# Patient Record
Sex: FEMALE | Race: WHITE | NOT HISPANIC OR LATINO | URBAN - METROPOLITAN AREA
[De-identification: names, ages, dates, MRNs, and addresses within clinical notes are randomized per-mention and may not be internally consistent; named-entity substitution may affect disease eponyms.]

---

## 2017-04-13 ENCOUNTER — APPOINTMENT (RX ONLY)
Dept: URBAN - METROPOLITAN AREA CLINIC 12 | Facility: CLINIC | Age: 67
Setting detail: DERMATOLOGY
End: 2017-04-13

## 2017-04-13 DIAGNOSIS — D18.0 HEMANGIOMA: ICD-10-CM

## 2017-04-13 DIAGNOSIS — L82.0 INFLAMED SEBORRHEIC KERATOSIS: ICD-10-CM

## 2017-04-13 DIAGNOSIS — D22 MELANOCYTIC NEVI: ICD-10-CM

## 2017-04-13 DIAGNOSIS — L57.0 ACTINIC KERATOSIS: ICD-10-CM

## 2017-04-13 DIAGNOSIS — L82.1 OTHER SEBORRHEIC KERATOSIS: ICD-10-CM

## 2017-04-13 DIAGNOSIS — L81.4 OTHER MELANIN HYPERPIGMENTATION: ICD-10-CM

## 2017-04-13 DIAGNOSIS — L70.0 ACNE VULGARIS: ICD-10-CM

## 2017-04-13 PROBLEM — F41.9 ANXIETY DISORDER, UNSPECIFIED: Status: ACTIVE | Noted: 2017-04-13

## 2017-04-13 PROBLEM — D48.5 NEOPLASM OF UNCERTAIN BEHAVIOR OF SKIN: Status: ACTIVE | Noted: 2017-04-13

## 2017-04-13 PROBLEM — D18.01 HEMANGIOMA OF SKIN AND SUBCUTANEOUS TISSUE: Status: ACTIVE | Noted: 2017-04-13

## 2017-04-13 PROBLEM — M12.9 ARTHROPATHY, UNSPECIFIED: Status: ACTIVE | Noted: 2017-04-13

## 2017-04-13 PROBLEM — H91.90 UNSPECIFIED HEARING LOSS, UNSPECIFIED EAR: Status: ACTIVE | Noted: 2017-04-13

## 2017-04-13 PROBLEM — L20.84 INTRINSIC (ALLERGIC) ECZEMA: Status: ACTIVE | Noted: 2017-04-13

## 2017-04-13 PROBLEM — D22.5 MELANOCYTIC NEVI OF TRUNK: Status: ACTIVE | Noted: 2017-04-13

## 2017-04-13 PROCEDURE — 99203 OFFICE O/P NEW LOW 30 MIN: CPT | Mod: 25

## 2017-04-13 PROCEDURE — ? LIQUID NITROGEN

## 2017-04-13 PROCEDURE — 11100: CPT | Mod: 59

## 2017-04-13 PROCEDURE — ? TREATMENT REGIMEN

## 2017-04-13 PROCEDURE — ? BIOPSY BY SHAVE METHOD

## 2017-04-13 PROCEDURE — 17000 DESTRUCT PREMALG LESION: CPT

## 2017-04-13 ASSESSMENT — LOCATION DETAILED DESCRIPTION DERM
LOCATION DETAILED: LEFT MEDIAL DISTAL PRETIBIAL REGION
LOCATION DETAILED: NASAL SUPRATIP
LOCATION DETAILED: LEFT SUPERIOR MEDIAL LOWER BACK
LOCATION DETAILED: RIGHT RADIAL DORSAL HAND
LOCATION DETAILED: INFERIOR LUMBAR SPINE

## 2017-04-13 ASSESSMENT — LOCATION SIMPLE DESCRIPTION DERM
LOCATION SIMPLE: RIGHT HAND
LOCATION SIMPLE: NOSE
LOCATION SIMPLE: LEFT LOWER BACK
LOCATION SIMPLE: LEFT PRETIBIAL REGION
LOCATION SIMPLE: LOWER BACK

## 2017-04-13 ASSESSMENT — LOCATION ZONE DERM
LOCATION ZONE: TRUNK
LOCATION ZONE: NOSE
LOCATION ZONE: HAND
LOCATION ZONE: LEG

## 2017-04-13 NOTE — PROCEDURE: BIOPSY BY SHAVE METHOD
Lab: [id number]
Type Of Destruction Used: Cryotherapy
Billing Type: Third-Party Bill
Silver Nitrate Text: The wound bed was treated with silver nitrate after the biopsy was performed.
Notification Instructions: You will be contacted in approximately 7 to 10 business days regarding your biopsy or excision results.  If you have not heard from our office within 2 weeks, please call our office and ask to speak with Dr. Crews’s medical assistant, Tammi, at [phone], extension [phone].
Hemostasis: Aluminum Chloride
X Size Of Lesion In Cm: 0
Electrodesiccation Text: The wound bed was treated with electrodesiccation after the biopsy was performed.
Render Post-Care Instructions In Note?: yes
Wound Care: No ointment
Electrodesiccation And Curettage Text: The wound bed was treated with electrodesiccation and curettage after the biopsy was performed.
Biopsy Type: H and E
Anesthesia Volume In Cc (Will Not Render If 0): 0.5
Anesthesia Type: 1% lidocaine with 1:100,000 epinephrine and a 1:10 solution of 8.4% sodium bicarbonate
Consent: Written consent was obtained and risks were reviewed including but not limited to scarring, infection, bleeding, scabbing, incomplete removal, nerve damage and allergy to anesthesia.
Bill For Surgical Tray: no
Cryotherapy Text: The wound bed was treated with cryotherapy after the biopsy was performed.
Post-Care Instructions: I reviewed with the patient in detail post-care instructions. \\n\\n• Keep the bandaid or pressure dressing dry and in place for 24 hours or as ordered.  Then wet the dressing in the shower or with a wet towel.  Peel it off gently.  After 24 hours, you may bathe normally.\\n• Gently clean the site once a day with soap and water and apply Polysporin antibiotic ointment or petroleum jelly (Vaseline)or Aquaphor to avoid scab formation.  \\n• Do not submerge the site under water in a pool, bathtub or hot tub for 7 days or until sutures are removed.\\n• For any discomfort, you may use a non-aspirin product for pain and discomfort – such as Tylenol or Tylenol Extra-Strength.  AVOID Aleve®, Advil®, Motrin®, ibuprofen and aspirin products for the first 24 hours as they increase bleeding.\\n• If the surgical wound is on the face or scalp, swelling, bruising, and throbbing may occur which can be minimized by sleeping with the head elevated.  Swelling will occur and can be decreased by keeping the surgical site elevated and applying a cold pack around the bandage for 10-15 minutes of every hour.\\n• Oozing a small amount of blood may be controlled by applying continuous pressure directly to the site with a clean gauze or washcloth for 15-20 minutes.  If bleeding does not stop after 15- 20 minutes of holding continuous pressure, repeat for an additional 20 minutes.  If the bleeding persists, then please contact our office at the number below.\\n• Watch for signs of infection:  increasing tenderness or redness, swelling, drainage of pus, opening of wound or temperature over 101.  Call our office if you think you may have an infection.  A normal healing site will have some light redness around the edges of the site, but bright red would indicate a possible infection.
Size Of Lesion In Cm: 0.8
Dressing: bandage
Body Location Override (Optional - Billing Will Still Be Based On Selected Body Map Location If Applicable): Left medial lower leg
Biopsy Method: 15 blade
Detail Level: Detailed

## 2017-04-13 NOTE — PROCEDURE: LIQUID NITROGEN
Number Of Freeze-Thaw Cycles: 1 freeze-thaw cycle
Render Post-Care Instructions In Note?: no
Duration Of Freeze Thaw-Cycle (Seconds): 10
Detail Level: Detailed
Consent: The patient's consent was obtained including but not limited to risks of crusting, scabbing, blistering, scarring, darker or lighter pigmentary change, recurrence, incomplete removal and infection.
Post-Care Instructions: I reviewed with the patient in detail post-care instructions. Patient is to wear sunprotection, and avoid picking at any of the treated lesions. Pt may apply Vaseline to crusted or scabbing areas.

## 2017-04-13 NOTE — HPI: EVALUATION OF SKIN LESION(S)
Hpi Title: Evaluation of Skin Lesions
Additional History: [place] and has had several peeling and blistering sunburns.

## 2017-05-26 ENCOUNTER — APPOINTMENT (RX ONLY)
Dept: URBAN - METROPOLITAN AREA CLINIC 12 | Facility: CLINIC | Age: 67
Setting detail: DERMATOLOGY
End: 2017-05-26

## 2017-05-26 DIAGNOSIS — L57.0 ACTINIC KERATOSIS: ICD-10-CM

## 2017-05-26 PROCEDURE — 17000 DESTRUCT PREMALG LESION: CPT

## 2017-05-26 PROCEDURE — ? OTHER

## 2017-05-26 PROCEDURE — ? LIQUID NITROGEN

## 2017-05-26 ASSESSMENT — LOCATION DETAILED DESCRIPTION DERM: LOCATION DETAILED: LEFT MEDIAL DISTAL PRETIBIAL REGION

## 2017-05-26 ASSESSMENT — LOCATION ZONE DERM: LOCATION ZONE: LEG

## 2017-05-26 ASSESSMENT — LOCATION SIMPLE DESCRIPTION DERM: LOCATION SIMPLE: LEFT PRETIBIAL REGION

## 2017-05-26 NOTE — PROCEDURE: LIQUID NITROGEN
Consent: The patient's consent was obtained including but not limited to risks of crusting, scabbing, blistering, scarring, darker or lighter pigmentary change, recurrence, incomplete removal and infection.
Post-Care Instructions: I reviewed with the patient in detail post-care instructions. Patient is to wear sunprotection, and avoid picking at any of the treated lesions. Pt may apply Vaseline to crusted or scabbing areas.
Duration Of Freeze Thaw-Cycle (Seconds): 10
Render Post-Care Instructions In Note?: yes
Number Of Freeze-Thaw Cycles: 3 freeze-thaw cycles
Detail Level: Detailed

## 2017-05-26 NOTE — PROCEDURE: OTHER
Note Text (......Xxx Chief Complaint.): This diagnosis correlates with the
Detail Level: Detailed
Other (Free Text): Biopsy proven - LEFT MEDIAL LOWER LEG

## 2017-12-01 ENCOUNTER — APPOINTMENT (RX ONLY)
Dept: URBAN - METROPOLITAN AREA CLINIC 12 | Facility: CLINIC | Age: 67
Setting detail: DERMATOLOGY
End: 2017-12-01

## 2017-12-01 DIAGNOSIS — D22 MELANOCYTIC NEVI: ICD-10-CM

## 2017-12-01 DIAGNOSIS — L81.4 OTHER MELANIN HYPERPIGMENTATION: ICD-10-CM

## 2017-12-01 DIAGNOSIS — L82.1 OTHER SEBORRHEIC KERATOSIS: ICD-10-CM

## 2017-12-01 DIAGNOSIS — Z71.89 OTHER SPECIFIED COUNSELING: ICD-10-CM

## 2017-12-01 DIAGNOSIS — D18.0 HEMANGIOMA: ICD-10-CM

## 2017-12-01 DIAGNOSIS — L57.8 OTHER SKIN CHANGES DUE TO CHRONIC EXPOSURE TO NONIONIZING RADIATION: ICD-10-CM

## 2017-12-01 PROBLEM — D18.01 HEMANGIOMA OF SKIN AND SUBCUTANEOUS TISSUE: Status: ACTIVE | Noted: 2017-12-01

## 2017-12-01 PROBLEM — D22.5 MELANOCYTIC NEVI OF TRUNK: Status: ACTIVE | Noted: 2017-12-01

## 2017-12-01 PROCEDURE — 99213 OFFICE O/P EST LOW 20 MIN: CPT

## 2017-12-01 PROCEDURE — ? COUNSELING

## 2017-12-01 ASSESSMENT — LOCATION DETAILED DESCRIPTION DERM
LOCATION DETAILED: RIGHT PROXIMAL DORSAL FOREARM
LOCATION DETAILED: RIGHT RADIAL DORSAL HAND
LOCATION DETAILED: LEFT PROXIMAL DORSAL FOREARM
LOCATION DETAILED: LEFT DISTAL POSTERIOR THIGH
LOCATION DETAILED: LEFT RADIAL DORSAL HAND
LOCATION DETAILED: INFERIOR LUMBAR SPINE
LOCATION DETAILED: RIGHT DISTAL POSTERIOR THIGH
LOCATION DETAILED: LEFT SUPERIOR MEDIAL LOWER BACK

## 2017-12-01 ASSESSMENT — LOCATION ZONE DERM
LOCATION ZONE: TRUNK
LOCATION ZONE: ARM
LOCATION ZONE: LEG
LOCATION ZONE: HAND

## 2017-12-01 ASSESSMENT — LOCATION SIMPLE DESCRIPTION DERM
LOCATION SIMPLE: RIGHT FOREARM
LOCATION SIMPLE: RIGHT HAND
LOCATION SIMPLE: LEFT HAND
LOCATION SIMPLE: LEFT LOWER BACK
LOCATION SIMPLE: RIGHT POSTERIOR THIGH
LOCATION SIMPLE: LEFT FOREARM
LOCATION SIMPLE: LEFT POSTERIOR THIGH
LOCATION SIMPLE: LOWER BACK

## 2018-09-05 ENCOUNTER — HOSPITAL ENCOUNTER (INPATIENT)
Dept: HOSPITAL 80 - F3N | Age: 68
LOS: 37 days | Discharge: HOME HEALTH | DRG: 460 | End: 2018-10-12
Attending: NEUROLOGICAL SURGERY | Admitting: NEUROLOGICAL SURGERY
Payer: COMMERCIAL

## 2018-09-05 DIAGNOSIS — M51.16: ICD-10-CM

## 2018-09-05 DIAGNOSIS — M48.062: ICD-10-CM

## 2018-09-05 DIAGNOSIS — M43.16: Primary | ICD-10-CM

## 2018-09-05 PROCEDURE — C1713 ANCHOR/SCREW BN/BN,TIS/BN: HCPCS

## 2018-10-10 PROCEDURE — 0SG00AJ FUSION OF LUMBAR VERTEBRAL JOINT WITH INTERBODY FUSION DEVICE, POSTERIOR APPROACH, ANTERIOR COLUMN, OPEN APPROACH: ICD-10-PCS | Performed by: NEUROLOGICAL SURGERY

## 2018-10-10 PROCEDURE — 3E0U0GB INTRODUCTION OF RECOMBINANT BONE MORPHOGENETIC PROTEIN INTO JOINTS, OPEN APPROACH: ICD-10-PCS | Performed by: NEUROLOGICAL SURGERY

## 2018-10-10 PROCEDURE — 0QB00ZZ EXCISION OF LUMBAR VERTEBRA, OPEN APPROACH: ICD-10-PCS | Performed by: NEUROLOGICAL SURGERY

## 2018-10-10 PROCEDURE — 0ST20ZZ RESECTION OF LUMBAR VERTEBRAL DISC, OPEN APPROACH: ICD-10-PCS | Performed by: NEUROLOGICAL SURGERY

## 2018-10-10 PROCEDURE — 01NB0ZZ RELEASE LUMBAR NERVE, OPEN APPROACH: ICD-10-PCS | Performed by: NEUROLOGICAL SURGERY

## 2018-10-10 RX ADMIN — GABAPENTIN SCH: 300 CAPSULE ORAL at 15:21

## 2018-10-10 RX ADMIN — Medication PRN MCG: at 11:12

## 2018-10-10 RX ADMIN — GABAPENTIN SCH: 300 CAPSULE ORAL at 23:33

## 2018-10-10 RX ADMIN — Medication PRN MCG: at 11:06

## 2018-10-10 RX ADMIN — GABAPENTIN SCH: 300 CAPSULE ORAL at 21:43

## 2018-10-10 RX ADMIN — ACETAMINOPHEN SCH MG: 500 TABLET ORAL at 15:18

## 2018-10-10 RX ADMIN — Medication SCH MLS: at 17:34

## 2018-10-10 RX ADMIN — DOCUSATE SODIUM AND SENNOSIDES SCH: 50; 8.6 TABLET ORAL at 15:12

## 2018-10-10 RX ADMIN — DOCUSATE SODIUM AND SENNOSIDES SCH TAB: 50; 8.6 TABLET ORAL at 21:44

## 2018-10-10 RX ADMIN — HYDROMORPHONE HYDROCHLORIDE PRN MG: 2 INJECTION INTRAMUSCULAR; INTRAVENOUS; SUBCUTANEOUS at 11:26

## 2018-10-10 RX ADMIN — FAMOTIDINE SCH: 20 TABLET, FILM COATED ORAL at 15:12

## 2018-10-10 RX ADMIN — HYDROMORPHONE HYDROCHLORIDE PRN MG: 2 INJECTION INTRAMUSCULAR; INTRAVENOUS; SUBCUTANEOUS at 11:15

## 2018-10-10 RX ADMIN — FAMOTIDINE SCH MG: 20 TABLET, FILM COATED ORAL at 21:42

## 2018-10-10 RX ADMIN — Medication SCH MLS: at 23:25

## 2018-10-10 RX ADMIN — OXYCODONE HYDROCHLORIDE PRN MG: 15 TABLET ORAL at 18:41

## 2018-10-10 RX ADMIN — ACETAMINOPHEN SCH MG: 500 TABLET ORAL at 21:42

## 2018-10-10 NOTE — SOAPPROG
SOAP Progress Note


Assessment/Plan: 








Post Op Visit:





S: Awake and alert.  Pt with expected lower back pain


O: AFVSS/PERRLA/EOMI


no droop


CN 2-12 grossly intact


+lt touch


5/5 BUE/BLE =


CDI


MELISSA in place and working well





A/P: 68 yo female that is s/p TLIF at L4/5


-orders in place


-brace when out of bed


-call with any questions or concerns


-pt understands and agrees


-no bending or twisting








10/10/18 11:04








ICD10 Worksheet


Patient Problems: 


 Problems











Problem Status Onset


 


Arthrodesis status Acute  


 


Lumbar radicular pain Acute  


 


Lumbar stenosis Acute  














- ICD10 Problem Qualifiers


(1) Lumbar radicular pain








(2) Lumbar stenosis








(3) Arthrodesis status

## 2018-10-10 NOTE — PDANEPAE
ANE History of Present Illness





L4-5 TLIF





ANE Past Medical History





- Cardiovascular History


Hx Hypertension: No


Hx Arrhythmias: No


Hx Chest Pain: No


Hx Coronary Artery / Peripheral Vascular Disease: No


Hx CHF / Valvular Disease: No


Hx Palpitations: No


Cardiovascular History Comment: blood pressure is normal but went very low 

after surgery 01/2018





- Pulmonary History


Hx COPD: No


Hx Asthma/Reactive Airway Disease: No


Hx Recent Upper Respiratory Infection: No


Hx Oxygen in Use at Home: No


Hx Sleep Apnea: No


Sleep Apnea Screening Result - Last Documented: Negative


Pulmonary History Comment: chronic cough from reflux





- Neurologic History


Hx Cerebrovascular Accident: No


Hx Seizures: No


Hx Dementia: No


Neurologic History Comment: cervical stenosis causes neck pain at times





- Endocrine History


Hx Diabetes: No





- Renal History


Hx Renal Disorders: No





- Liver History


Hx Hepatic Disorders: No





- Neurological & Psychiatric Hx


Hx Neurological and Psychiatric Disorders: Yes


Neurological / Psychiatric History Comment: tends to be anxious- no diagnosis





- Cancer History


Hx Cancer: No





- Congenital Disorder History


Hx Congenital Disorders: No





- GI History


Hx Gastrointestinal Disorders: Yes


Gastrointestinal History Comment: reflux- chronic cough from





- Other Health History


Other Health History: wears bilateral hearing aides.  wears glasses.  old torn 

rtc on right.  weak and painful left big toe when walking





- Chronic Pain History


Chronic Pain: Yes (lower back and legs and some neck)





- Surgical History


Prior Surgeries: left MARCE 01/2018 at [facility].  





ANE Review of Systems


Review of Systems: 








- Exercise capacity


METS (RN): 4 METS





ANE Patient History





- Allergies


Allergies/Adverse Reactions: 








thimerosal Allergy (Verified 09/24/18 16:04)


 From contact lens solution - eyes red/itchy








- Home Medications


Home medications: home medication list seen and reviewed


Home Medications: 








Ascorbic Acid [Vitamin C 500 mg (*)] 1,000 mg PO BID 09/17/18 [Last Taken 10/03/

18]


C/E/Zn/Cu/OM3/DHA/EPA/LUT/ZEAX [Preservision Areds 2 Softgel] 1 each PO BID 09/ 17/18 [Last Taken 10/03/18]


Cholecalciferol Vit D3 [Vitamin D3 (*)] 1,000 units PO DAILY 09/17/18 [Last 

Taken 10/03/18]


Eszopiclone [Lunesta] 2 mg PO HS 09/17/18 [Last Taken 10/09/18]


Herbals/Supplements -Info Only 1 ea PO DAILY 09/17/18 [Last Taken 10/03/18]


traZODone [traZODONE 50MG (*)] 100 - 150 mg PO HS 09/17/18 [Last Taken Unknown]








- NPO status


NPO Since - Liquids (Date): 10/10/18


NPO Since - Liquids (Time): 07:00


NPO Since - Solids (Date): 10/09/18





- Smoking Hx


Smoking Status: Never smoked





- Family Anes Hx


Family Hx Anesthesia Complications: none





ANE Labs/Vital Signs





- Vital Signs


Height: 166.37 cm


Weight: 70.307 kg





ANE Physical Exam





- Airway


Neck exam: FROM


Mallampati Score: Class 2


Mouth exam: normal dental/mouth exam





- Pulmonary


Pulmonary: no respiratory distress





- Cardiovascular


Cardiovascular: regular rate and rhythym





- ASA Status


ASA Status: II





ANE Anesthesia Plan


Anesthesia Plan: general endotracheal anesthesia


Lines/Monitors: additional IV

## 2018-10-10 NOTE — POSTOPPROG
Post Op Note


Date of Operation: 10/10/18


Surgeon: LUIS Butts


Assistant: Alexandra


Anesthesia: GET(General Endotracheal), Local (Specify)


Pre-op Diagnosis: lumbar stenosis L4/5


Post-op Diagnosis: Lumbar stenosis L4/5


Indication: lumbar radiculopathy/stenosis


Procedure: TLIF L4/5


Findings: none


Inf/Abcess present in the surg proc area at time of surgery?: No


Depth: Deep Incisional (Fascial)


EBL: 100-500


Drains: Priyank Mason

## 2018-10-11 RX ADMIN — GABAPENTIN SCH: 300 CAPSULE ORAL at 14:25

## 2018-10-11 RX ADMIN — DOCUSATE SODIUM AND SENNOSIDES SCH TAB: 50; 8.6 TABLET ORAL at 20:37

## 2018-10-11 RX ADMIN — GABAPENTIN SCH: 300 CAPSULE ORAL at 23:04

## 2018-10-11 RX ADMIN — METHOCARBAMOL PRN MG: 750 TABLET ORAL at 08:20

## 2018-10-11 RX ADMIN — FAMOTIDINE SCH MG: 20 TABLET, FILM COATED ORAL at 20:37

## 2018-10-11 RX ADMIN — OXYCODONE HYDROCHLORIDE PRN MG: 15 TABLET ORAL at 18:16

## 2018-10-11 RX ADMIN — DOCUSATE SODIUM AND SENNOSIDES SCH TAB: 50; 8.6 TABLET ORAL at 08:20

## 2018-10-11 RX ADMIN — FAMOTIDINE SCH MG: 20 TABLET, FILM COATED ORAL at 08:20

## 2018-10-11 RX ADMIN — ACETAMINOPHEN SCH: 500 TABLET ORAL at 05:12

## 2018-10-11 RX ADMIN — ACETAMINOPHEN SCH MG: 500 TABLET ORAL at 22:02

## 2018-10-11 RX ADMIN — ACETAMINOPHEN SCH MG: 500 TABLET ORAL at 14:13

## 2018-10-11 RX ADMIN — METHOCARBAMOL PRN MG: 750 TABLET ORAL at 14:30

## 2018-10-11 NOTE — NEUSURGPN
Date of Surgery: 10/10/18


Post Op Day: 1


Assessment/Plan: 


Assessment: 68 yo female that is s/p TLIF at L4/5 POD #1





Plan:


-s/p TLIF L4/5-pt states she has some expected lower back pain, legs feel fine


-PT/OT pending


-orders in place


-brace when out of bed


-post op xrays pending


-call with any questions or concerns


-pt understands and agrees


-no bending or twisting


Subjective: 


Awake and alert. NAD.  Eating/drinking and voiding.  No f/c/n/v/d.  No ha/neck/

chest/abd of gu complaints.  


Objective: 


AFVSS/PERRLA/EOMI


no droop


CN 2-12 grossly intact


+lt touch


5/5 BUE/BLE =


CDI


MELISSA in place and working well-will dc later today


Neuro Check Frequency: per routine


Urinary Catheter in Place: No





- Physician


Discussed Patient with .: Benjamín


Patient Seen by .: Benjamín





Neurosurgery Physical Exam





- Vitals, I&O, Labs





 I and O











 10/10/18 10/11/18 10/12/18





 05:59 05:59 05:59


 


Intake Total  5101 


 


Output Total  4235 


 


Balance  866 


 


Weight  70.307 kg 


 


Intake:   


 


  Oral (ml)  2750 


 


  IV Intake (ml)  1600 


 


  IV Infused (ml)  751 


 


    Lr 1,000 ml @ TKO IV ONCE  751 





    ONE Rx#:[id number]   


 


Output:   


 


  Urine (ml)  3875 


 


    Toilet  3875 


 


  Estimated Blood Loss (ml)  100 


 


  MELISSA Drain Output (ml)  260 


 


    #1 Back Priyank Mason  260 


 


Other:   


 


  Number of Voids   


 


    Toilet  1 








 Vital Signs











Temp Pulse Resp BP Pulse Ox


 


 37.1 C   74   16   117/82 H  92 


 


 10/11/18 08:00  10/11/18 08:00  10/11/18 08:00  10/11/18 08:00  10/11/18 08:00














ICD10 Worksheet


Patient Problems: 


 Problems











Problem Status Onset


 


Arthrodesis status Acute  


 


Lumbar radicular pain Acute  


 


Lumbar stenosis Acute  














- ICD10 Problem Qualifiers


(1) Lumbar radicular pain








(2) Lumbar stenosis








(3) Arthrodesis status

## 2018-10-11 NOTE — ASMTCMCOM
CM Note

 

CM Note                       

Notes:

Pt s/p planned surgery for stenosis, DDD. [facility], [facility] with [facility] and [facility]. Renetta with [facility] visited 

with pt today. [facility] has protocol with MD office so no orders needed at d/c



D/c plan of care: Home with [facility]

 

Date Signed:  10/11/2018 02:12 PM

Electronically Signed By:CHRISTI Agosto

## 2018-10-11 NOTE — GOP
DATE OF OPERATION:  10/10/2018



SURGEON:  FRANSISCO Butts MD



ASSISTANT:  Adolfo Morris PA-C.



PREOPERATIVE DIAGNOSIS:  Lumbar spondylolisthesis, L4-5.  Severe stenosis, L4-5.  Bilateral lumbosacr
al radiculopathy, L4-5.



POSTOPERATIVE DIAGNOSIS:  Lumbar spondylolisthesis, L4-5. Severe stenosis, L4-5.  Bilateral lumbosacr
al radiculopathy, L4-5.



PROCEDURE PERFORMED:  Posterolateral intervertebral arthrodesis with bilateral decompressions, L4-5 (
23172); posterior nonsegmental instrumentation across a single interspace L4-5 (95038); spinal stereo
taxis, placement of biomechanical intervertebral device, L4-5, same incision bone graft harvest, micr
oscope.



FINDINGS:  Consistent with the MRI.





ESTIMATED BLOOD LOSS:  Less than 100 cc.



INDICATIONS:  The patient is a 67-year-old who had bilateral lumbosacral radiculopathy and severe cayla
n radiating down both legs, whose MRI demonstrated a spondylolisthesis at L4-5, severe stenosis at th
at level and I suggested a single-level fusion with bilateral decompressions.  The risk of continued 
symptoms was discussed.  She knew there was a chance surgery would fail to eliminate the pain.  She k
new there was a risk of nerve injury, spinal fluid leak, infection, pseudoarthrosis, and adjacent seg
ment disease.  She wanted to proceed despite these risks.



DESCRIPTION OF PROCEDURE:  The patient was taken to the operating room, placed in supine position.  G
eneral anesthesia was begun.  She was flipped prone onto the Priyank table.  Care was taken to pad al
l points of contact.  Her back was sterilely prepped and draped in usual fashion.  A localizing x-ray
 was taken.  We made a midline incision.  It was approximately 4 cm in length.  The subcutaneous tiss
ue was dissected using Bovie cautery down to the fascia and subperiosteal dissection was made down th
e L4-5 lamina.  Self-retaining retractor was placed.  We shot a localizing x-ray.  We denuded the conrad
ateral hypertrophic L4-5 facet joint, decorticated transverse processes at L4-5.  We attached the Jesse
alth reference frame to the L4 spinous process.  We performed an O-arm spin and using frame of the [facility] stereotaxy, we placed pedicle screws bilaterally at L4 and L5.  They all stimulated at acceptab
le levels.  The screws were in excellent position.  We placed rods down over the screws and did not r
eally distract.  We simply tightened the rods down on the tulips.  Her bone was quite soft when placi
ng the screws and we did not want to distract.  As we tightened the set screws down over the rods, th
ere was some reduction of her spondylolisthesis and we were happy with the way the x-rays looked.  We
 removed all the soft tissue from the bone at L4-5, harvested the inferior L4 spinous process for aut
ologous grafting purposes.  We drilled bilateral L4 laminectomy and harvested this bone for autologou
s grafting purposes.  Under the microscope, we opened the ligamentum flavum and decompressed the thec
al sac.  There was severe stenosis on each side.  The dura had an hourglass appearance of the area ju
st above and at the level of the L5 pedicle.  We got a great decompression.  We then swept the nerve 
root medially, incised the L4-5 disk and removed the disk and the cartilaginous endplates at L4-5.  W
e roughened the subchondral bone to create arthrodesis and chose a 7 x 23 mm device.  We inserted it 
at L4-5, expanded it under fluoroscopic guidance.  Bone morphogenic protein was placed in the disk sp
ace.  1 mg was placed there.  1 mg was placed posterolaterally.  Bone autograft was also placed in th
e disk space.  We placed bone autograft posterolaterally after decorticating all the posterolateral b
one.  We then placed a subfascial drain, closed the incision in multiple layers using Vicryl sutures.
  Final x-rays were taken.  All hardware was in excellent position.  There were no complications.



COMPLICATIONS:  None.



INSTRUMENTATION USED:  Solera 5.5 mm system with titanium rods, extra-small bone morphogenic protein 
and a 7 x 23 mm Elevate cage, all manufactured by "[employer].



COMPLICATIONS:  None.





Job #:  [id number]

## 2018-10-12 VITALS — DIASTOLIC BLOOD PRESSURE: 79 MMHG | SYSTOLIC BLOOD PRESSURE: 131 MMHG

## 2018-10-12 RX ADMIN — FAMOTIDINE SCH MG: 20 TABLET, FILM COATED ORAL at 08:48

## 2018-10-12 RX ADMIN — GABAPENTIN SCH: 300 CAPSULE ORAL at 07:05

## 2018-10-12 RX ADMIN — ACETAMINOPHEN SCH MG: 500 TABLET ORAL at 05:35

## 2018-10-12 RX ADMIN — OXYCODONE HYDROCHLORIDE PRN MG: 15 TABLET ORAL at 12:52

## 2018-10-12 RX ADMIN — METHOCARBAMOL PRN MG: 750 TABLET ORAL at 05:35

## 2018-10-12 RX ADMIN — DOCUSATE SODIUM AND SENNOSIDES SCH TAB: 50; 8.6 TABLET ORAL at 08:48

## 2018-10-12 NOTE — ASMTCMCOM
CM Note

 

CM Note                       

Notes:

Pt medically stable for d/c with [facility]. [facility] has protocol with MD office so no orders 

needed. 

 

Date Signed:  10/12/2018 10:31 AM

Electronically Signed By:CHRISTI Agosto

## 2018-10-12 NOTE — NEUSURGPN
Date of Surgery: 10/10/18


Post Op Day: 2


Assessment/Plan: 


Assessment: 68 yo female that is s/p TLIF at L4/5 POD #2





Plan:


-neuro stable


-PT/OT 


-brace when out of bed


-post op xrays stable


-call with any questions or concerns


-plan for discharge today





Subjective: 


Doing well this morning.  No LE symptoms.  Walking around room. 


Objective: 


Awake. Alert. PERRL. EOMI


Walking with ease


Muscle strength full 





- Physician


Discussed Patient with Dr.: Benjamín





Neurosurgery Physical Exam





- Vitals, I&O, Labs





 I and O











 10/11/18 10/12/18 10/13/18





 05:59 05:59 05:59


 


Intake Total 5101 2850 


 


Output Total 4235 2800 


 


Balance 866 50 


 


Weight 70.307 kg  


 


Intake:   


 


  Oral (ml) 2750 2850 


 


  IV Intake (ml) 1600  


 


  IV Infused (ml) 751  


 


    Lr 1,000 ml @ TKO IV ONCE 751  





    ONE Rx#:[id number]   


 


Output:   


 


  Urine (ml) 3875 2800 


 


    Toilet 3875 2800 


 


  Estimated Blood Loss (ml) 100  


 


  MELISSA Drain Output (ml) 260  


 


    #1 Back Priyank Mason 260  


 


Other:   


 


  Number of Voids   


 


    Toilet 1 1 








 Vital Signs











Temp Pulse Resp BP Pulse Ox


 


 36.9 C   82   16   110/62   92 


 


 10/11/18 22:55  10/11/18 22:55  10/11/18 22:55  10/11/18 22:55  10/11/18 22:55














ICD10 Worksheet


Patient Problems: 


 Problems











Problem Status Onset


 


Arthrodesis status Acute  


 


Lumbar radicular pain Acute  


 


Lumbar stenosis Acute

## 2018-10-12 NOTE — ASMTLACE
LACE

 

Length of stay for            Answers:  3 days                                

current admission                                                             

Acuity / Level of             Answers:  Yes                                   

Care: Did the patient                                                         

have an inpatient                                                             

admission?                                                                    

Comorbidities - select        Answers:  Opioid dependence                     

all that apply                          / Chronic pain                        

# of Emergency department     Answers:  0                                     

visits in the last 6                                                          

months                                                                        

Score: 10

 

Date Signed:  10/12/2018 09:54 AM

Electronically Signed By:CHRISTI Agosto

## 2018-10-12 NOTE — ASDISCHSUM
----------------------------------------------

Discharge Information

----------------------------------------------

Plan Status:Home with Home Health                    Medically Cleared to Leave:

Discharge Date:10/12/2018 01:18 PM                   CM D/C Disposition:

[facility]                        Projected Discharge Date:10/12/2018 11:00 AM

Transportation at D/C:                               Discharge Delay Reason:

Follow-Up Date:10/12/2018 11:00 AM                   Discharge Slot:

Final Diagnosis:

----------------------------------------------

Placement Information

----------------------------------------------

Referral Type:*Home Health Care Services             Referral ID:[id number]

Provider Name:[facility])

Address 1:[address] [facility]                       Phone [phone]

Address 2:[facility] [address]                                  Fax [phone]

City:[place]                               Selection Factors:

State:[place]

 

----------------------------------------------

Patient Contact Information

----------------------------------------------

Contact Name:SHAMIKA                           Relationship:

Address:                                             Home [phone]

                                                     Work Phone:

City:                                                [facility] Phone:

[facility] Code:                                      Email:

----------------------------------------------

Financial Information

----------------------------------------------

Financial Class:Medicare

Primary Plan Desc:MEDICARE INPATIENT                 Primary Plan Number:[id number]

Secondary Plan Desc:[employer]/MDR SUPPLEMENT              Secondary Plan Number:[id number]

 

 

----------------------------------------------

Assessment Information

----------------------------------------------

----------------------------------------------

LACE

----------------------------------------------

LACE

 

Length of stay for            Answers:  3 days                                

current admission                                                             

Acuity / Level of             Answers:  Yes                                   

Care: Did the patient                                                         

have an inpatient                                                             

admission?                                                                    

Comorbidities - select        Answers:  Opioid dependence                     

all that apply                          / Chronic pain                        

# of Emergency department     Answers:  0                                     

visits in the last 6                                                          

months                                                                        

Score: 10

 

Date Signed:  10/12/2018 09:54 AM

Electronically Signed By:CHRISTI Agosto

 

 

----------------------------------------------

[facility] CM Progress Note

----------------------------------------------

CM Note

 

CM Note                       

Notes:

Pt s/p planned surgery for stenosis, DDD. PT rec outpatient. [facility] was pre-arranged by MD 

office, pt does want the [facility] to assist with home safety and bathing. Renetta with [facility] visited 

with pt today. [facility] has protocol with MD office so no orders needed at d/c



D/c plan of care: Home with [facility]

 

Date Signed:  10/11/2018 02:12 PM

Electronically Signed By:CHRISTI Agosto

 

 

----------------------------------------------

[facility] CM Progress Note

----------------------------------------------

CM Note

 

CM Note                       

Notes:

Pt medically stable for d/c with [facility]. [facility] has protocol with MD office so no orders 

needed. 

 

Date Signed:  10/12/2018 10:31 AM

Electronically Signed By:CHRISTI Agosto

 

 

----------------------------------------------

Intervention Information

----------------------------------------------

Intervention Type:*IM-Signed                         Date of Service:10/12/2018 12:00 PM

Patient Type:Inpatient                               Staff Member:Danyelle Salazar

Hours:                                               Discipline:

Severity:                                            Comment:

## 2019-03-07 ENCOUNTER — APPOINTMENT (RX ONLY)
Dept: URBAN - METROPOLITAN AREA CLINIC 12 | Facility: CLINIC | Age: 69
Setting detail: DERMATOLOGY
End: 2019-03-07

## 2019-03-07 DIAGNOSIS — L81.4 OTHER MELANIN HYPERPIGMENTATION: ICD-10-CM

## 2019-03-07 DIAGNOSIS — D18.0 HEMANGIOMA: ICD-10-CM

## 2019-03-07 DIAGNOSIS — D22 MELANOCYTIC NEVI: ICD-10-CM

## 2019-03-07 DIAGNOSIS — L57.0 ACTINIC KERATOSIS: ICD-10-CM

## 2019-03-07 DIAGNOSIS — Z71.89 OTHER SPECIFIED COUNSELING: ICD-10-CM

## 2019-03-07 PROBLEM — D18.01 HEMANGIOMA OF SKIN AND SUBCUTANEOUS TISSUE: Status: ACTIVE | Noted: 2019-03-07

## 2019-03-07 PROBLEM — D22.5 MELANOCYTIC NEVI OF TRUNK: Status: ACTIVE | Noted: 2019-03-07

## 2019-03-07 PROCEDURE — ? COUNSELING

## 2019-03-07 PROCEDURE — ? DEFER

## 2019-03-07 PROCEDURE — 99214 OFFICE O/P EST MOD 30 MIN: CPT

## 2019-03-07 ASSESSMENT — LOCATION DETAILED DESCRIPTION DERM
LOCATION DETAILED: UPPER STERNUM
LOCATION DETAILED: INFERIOR LUMBAR SPINE
LOCATION DETAILED: MIDDLE STERNUM
LOCATION DETAILED: LEFT MEDIAL SUPERIOR CHEST

## 2019-03-07 ASSESSMENT — LOCATION ZONE DERM: LOCATION ZONE: TRUNK

## 2019-03-07 ASSESSMENT — LOCATION SIMPLE DESCRIPTION DERM
LOCATION SIMPLE: CHEST
LOCATION SIMPLE: LOWER BACK

## 2019-03-07 NOTE — PROCEDURE: DEFER
Detail Level: Simple
Introduction Text (Please End With A Colon): The following procedure was deferred:
Procedure To Be Performed At Next Visit: Cryotherapy

## 2019-03-28 ENCOUNTER — APPOINTMENT (RX ONLY)
Dept: URBAN - METROPOLITAN AREA CLINIC 12 | Facility: CLINIC | Age: 69
Setting detail: DERMATOLOGY
End: 2019-03-28

## 2019-03-28 DIAGNOSIS — L57.0 ACTINIC KERATOSIS: ICD-10-CM

## 2019-03-28 PROCEDURE — ? COUNSELING

## 2019-03-28 PROCEDURE — ? LIQUID NITROGEN

## 2019-03-28 PROCEDURE — 17000 DESTRUCT PREMALG LESION: CPT

## 2019-03-28 PROCEDURE — 17003 DESTRUCT PREMALG LES 2-14: CPT

## 2019-03-28 ASSESSMENT — LOCATION SIMPLE DESCRIPTION DERM
LOCATION SIMPLE: CHEST
LOCATION SIMPLE: RIGHT BREAST

## 2019-03-28 ASSESSMENT — LOCATION DETAILED DESCRIPTION DERM
LOCATION DETAILED: MIDDLE STERNUM
LOCATION DETAILED: RIGHT MEDIAL BREAST 2-3:00 REGION
LOCATION DETAILED: LOWER STERNUM

## 2019-03-28 ASSESSMENT — LOCATION ZONE DERM: LOCATION ZONE: TRUNK

## 2019-03-28 ASSESSMENT — PAIN INTENSITY VAS: HOW INTENSE IS YOUR PAIN 0 BEING NO PAIN, 10 BEING THE MOST SEVERE PAIN POSSIBLE?: NO PAIN

## 2019-03-28 NOTE — PROCEDURE: LIQUID NITROGEN
Post-Care Instructions: I reviewed with the patient in detail post-care instructions. Patient is to wear sunprotection, and avoid picking at any of the treated lesions. Pt may apply Vaseline to crusted or scabbing areas.
Duration Of Freeze Thaw-Cycle (Seconds): 10
Render Post-Care Instructions In Note?: yes
Detail Level: Detailed
Consent: The patient's consent was obtained including but not limited to risks of crusting, scabbing, blistering, scarring, darker or lighter pigmentary change, recurrence, incomplete removal and infection.
Number Of Freeze-Thaw Cycles: 1 freeze-thaw cycle

## 2019-08-20 ENCOUNTER — APPOINTMENT (RX ONLY)
Dept: URBAN - METROPOLITAN AREA CLINIC 12 | Facility: CLINIC | Age: 69
Setting detail: DERMATOLOGY
End: 2019-08-20

## 2019-08-20 DIAGNOSIS — L84 CORNS AND CALLOSITIES: ICD-10-CM

## 2019-08-20 DIAGNOSIS — L57.0 ACTINIC KERATOSIS: ICD-10-CM

## 2019-08-20 PROCEDURE — ? COUNSELING

## 2019-08-20 PROCEDURE — 11056 PARNG/CUTG B9 HYPRKR LES 2-4: CPT | Mod: 59

## 2019-08-20 PROCEDURE — 17000 DESTRUCT PREMALG LESION: CPT

## 2019-08-20 PROCEDURE — ? PARING HYPERKERATOTIC LESION

## 2019-08-20 PROCEDURE — ? LIQUID NITROGEN

## 2019-08-20 ASSESSMENT — LOCATION SIMPLE DESCRIPTION DERM
LOCATION SIMPLE: LEFT FOREARM
LOCATION SIMPLE: RIGHT 4TH TOE
LOCATION SIMPLE: RIGHT 5TH TOE

## 2019-08-20 ASSESSMENT — LOCATION DETAILED DESCRIPTION DERM
LOCATION DETAILED: RIGHT LATERAL 4TH TOE
LOCATION DETAILED: RIGHT MEDIAL 5TH TOE
LOCATION DETAILED: LEFT DISTAL DORSAL FOREARM

## 2019-08-20 ASSESSMENT — LOCATION ZONE DERM
LOCATION ZONE: ARM
LOCATION ZONE: TOE

## 2019-08-20 ASSESSMENT — PAIN INTENSITY VAS: HOW INTENSE IS YOUR PAIN 0 BEING NO PAIN, 10 BEING THE MOST SEVERE PAIN POSSIBLE?: NO PAIN

## 2019-08-20 NOTE — PROCEDURE: LIQUID NITROGEN
Post-Care Instructions: I reviewed with the patient in detail post-care instructions. Patient is to wear sunprotection, and avoid picking at any of the treated lesions. Pt may apply Vaseline to crusted or scabbing areas.
Number Of Freeze-Thaw Cycles: 2 freeze-thaw cycles
Detail Level: Simple
Render Post-Care Instructions In Note?: yes
Consent: The patient's consent was obtained including but not limited to risks of crusting, scabbing, blistering, scarring, darker or lighter pigmentary change, recurrence, incomplete removal and infection.
Duration Of Freeze Thaw-Cycle (Seconds): 10
Render Note In Bullet Format When Appropriate: No

## 2019-08-20 NOTE — PROCEDURE: PARING HYPERKERATOTIC LESION
Medical Necessity Clause: This procedure was medically necessary because the patient has substantial pain.
Paring Method: curette
Medical Necessity Information: LCD Guidelines vary from payer to payer. Please check with your payer's policy to determine medical necessity. Many payers require at least 1 Class A indication, 2 Class B indications or 1 Class B and 2 Class C to qualify for insurance payment.